# Patient Record
Sex: MALE | Race: OTHER | Employment: OTHER | URBAN - METROPOLITAN AREA
[De-identification: names, ages, dates, MRNs, and addresses within clinical notes are randomized per-mention and may not be internally consistent; named-entity substitution may affect disease eponyms.]

---

## 2020-05-07 ENCOUNTER — HOSPITAL ENCOUNTER (EMERGENCY)
Age: 45
Discharge: HOME | End: 2020-05-07
Attending: EMERGENCY MEDICINE
Payer: SELF-PAY

## 2020-05-07 VITALS
OXYGEN SATURATION: 98 % | DIASTOLIC BLOOD PRESSURE: 97 MMHG | RESPIRATION RATE: 16 BRPM | SYSTOLIC BLOOD PRESSURE: 160 MMHG | HEART RATE: 90 BPM | TEMPERATURE: 97.8 F

## 2020-05-07 DIAGNOSIS — S01.81XA FACIAL LACERATION, INITIAL ENCOUNTER: Primary | ICD-10-CM

## 2020-05-07 PROCEDURE — 74011250636 HC RX REV CODE- 250/636: Performed by: PHYSICIAN ASSISTANT

## 2020-05-07 PROCEDURE — 90715 TDAP VACCINE 7 YRS/> IM: CPT | Performed by: PHYSICIAN ASSISTANT

## 2020-05-07 PROCEDURE — 99282 EMERGENCY DEPT VISIT SF MDM: CPT

## 2020-05-07 PROCEDURE — 90471 IMMUNIZATION ADMIN: CPT

## 2020-05-07 PROCEDURE — 75810000293 HC SIMP/SUPERF WND  RPR

## 2020-05-07 RX ADMIN — TETANUS TOXOID, REDUCED DIPHTHERIA TOXOID AND ACELLULAR PERTUSSIS VACCINE, ADSORBED 0.5 ML: 5; 2.5; 8; 8; 2.5 SUSPENSION INTRAMUSCULAR at 10:07

## 2020-05-07 NOTE — ED PROVIDER NOTES
Patient is here with a laceration to his left eyebrow. This happened when he was getting some pallets off of a tall pile of pallets. 1 accidentally hit the side of his eye. The bleeding is controlled. He states that his last tetanus was at least 8 to 10 years ago. He has no headache, pain to the orbital area, visual changes, neck pain or other symptoms. He did ambulate to the room without difficulty and is well-hydrated. No other injuries. The history is provided by the patient. Laceration    The incident occurred less than 1 hour ago. The laceration is located on the face. The laceration is 1 cm in size. The injury mechanism is a blunt object. Foreign body present: no. The pain is at a severity of 5/10. The pain is moderate. The pain has been constant since onset. Pertinent negatives include no numbness, no tingling, no weakness, no loss of motion, no coolness and no discoloration. It is unknown when the patient last had a tetanus shot. No past medical history on file. No past surgical history on file. No family history on file.     Social History     Socioeconomic History    Marital status: Not on file     Spouse name: Not on file    Number of children: Not on file    Years of education: Not on file    Highest education level: Not on file   Occupational History    Not on file   Social Needs    Financial resource strain: Not on file    Food insecurity     Worry: Not on file     Inability: Not on file    Transportation needs     Medical: Not on file     Non-medical: Not on file   Tobacco Use    Smoking status: Not on file   Substance and Sexual Activity    Alcohol use: Not on file    Drug use: Not on file    Sexual activity: Not on file   Lifestyle    Physical activity     Days per week: Not on file     Minutes per session: Not on file    Stress: Not on file   Relationships    Social connections     Talks on phone: Not on file     Gets together: Not on file     Attends Buddhism service: Not on file     Active member of club or organization: Not on file     Attends meetings of clubs or organizations: Not on file     Relationship status: Not on file    Intimate partner violence     Fear of current or ex partner: Not on file     Emotionally abused: Not on file     Physically abused: Not on file     Forced sexual activity: Not on file   Other Topics Concern    Not on file   Social History Narrative    Not on file         ALLERGIES: Patient has no known allergies. Review of Systems   Constitutional: Negative. HENT: Negative. Eyes: Negative. Respiratory: Negative. Cardiovascular: Negative. Gastrointestinal: Negative. Genitourinary: Negative. Musculoskeletal: Negative. Skin: Positive for wound. Neurological: Negative. Negative for tingling, weakness and numbness. Psychiatric/Behavioral: Negative. All other systems reviewed and are negative. Vitals:    05/07/20 0948   BP: (!) 160/97   Pulse: 90   Resp: 16   Temp: 97.8 °F (36.6 °C)   SpO2: 98%            Physical Exam  Vitals signs and nursing note reviewed. Constitutional:       Appearance: He is well-developed. HENT:      Head: Normocephalic. Laceration present. No abrasion or left periorbital erythema. Jaw: There is normal jaw occlusion. Right Ear: External ear normal.      Left Ear: External ear normal.      Nose: Nose normal.   Eyes:      Conjunctiva/sclera: Conjunctivae normal.      Pupils: Pupils are equal, round, and reactive to light. Neck:      Musculoskeletal: Normal range of motion and neck supple. Cardiovascular:      Rate and Rhythm: Normal rate and regular rhythm. Heart sounds: Normal heart sounds. Pulmonary:      Effort: Pulmonary effort is normal.      Breath sounds: Normal breath sounds. Abdominal:      General: Bowel sounds are normal.      Palpations: Abdomen is soft. Musculoskeletal: Normal range of motion.    Skin:     General: Skin is warm and dry.   Neurological:      Mental Status: He is alert and oriented to person, place, and time. Deep Tendon Reflexes: Reflexes are normal and symmetric. Psychiatric:         Behavior: Behavior normal.         Thought Content: Thought content normal.         Judgment: Judgment normal.          MDM  Number of Diagnoses or Management Options  Facial laceration, initial encounter:   Risk of Complications, Morbidity, and/or Mortality  Presenting problems: moderate  Diagnostic procedures: moderate  Management options: moderate    Patient Progress  Patient progress: improved         Wound Repair  Date/Time: 5/7/2020 10:56 AM  Performed by: PAPreparation: skin prepped with Betadine  Pre-procedure re-eval: Immediately prior to the procedure, the patient was reevaluated and found suitable for the planned procedure and any planned medications. Time out: Immediately prior to the procedure a time out was called to verify the correct patient, procedure, equipment, staff and marking as appropriate. .  Location details: face  Wound length:2.5 cm or less  Anesthesia: local infiltration    Anesthesia:  Local Anesthetic: lidocaine 1% without epinephrine  Foreign bodies: no foreign bodies  Irrigation solution: saline  Irrigation method: syringe  Skin closure: Prolene  Number of sutures: 6  Technique: simple and interrupted  Approximation: close  Dressing: antibiotic ointment and 4x4  Patient tolerance: Patient tolerated the procedure well with no immediate complications  My total time at bedside, performing this procedure was 1-15 minutes. The patient was observed in the ED. Patient was instructed on wound care today. He was also asked to return if signs and symptoms of infection begin. Patient should return in 7 days for suture removal.  He has no injury to the orbital area. His tetanus was updated today. Wash two-three times daily with soap and water, blot dry, apply neosporin and a clean dressing.  Watch for redness, swelling, pus, increasing pain, fever and return if any of those symptoms begin. Finish all of the antibiotics. Return to the ED if worse. Patient is stable for discharge and ambulatory out of the ED without difficulty. I discussed the results of all labs, procedures, radiographs, and treatments with the patient and available family. Treatment plan is agreed upon and the patient is ready for discharge. All voiced understanding of the discharge plan and medication instructions or changes as appropriate. Questions about treatment in the ED were answered. All were encouraged to return should symptoms worsen or new problems develop.

## 2020-05-07 NOTE — ED TRIAGE NOTES
Patient states he was fixing a wooden pallet at work this morning and it hit him on his right eyebrow. Patient denies any loc. Patient states his last tetanus shot was over 8 years ago. Patient alert and oriented x4 in triage.  Patient has mask in place on arrival to triage

## 2020-05-07 NOTE — DISCHARGE INSTRUCTIONS
Patient Education      Use Zinco oxide on your wound this summer while out in the sun to minimize scar. Wash two-three times daily with soap and water, blot dry, apply neosporin and a clean dressing. Watch for redness, swelling, pus, increasing pain, fever and return if any of those symptoms begin. Finish all of the antibiotics. Return to the ED if worse. Patient is stable for discharge and ambulatory out of the ED without difficulty. Return in 7 days for suture removal.       Patient Education        Powers en la pat cerrados con puntos: Instrucciones de cuidado  Cuts on the Face Closed With Stitches: Care Instructions  Instrucciones de cuidado  Un devin en la pat puede ser en la barbilla, la mejilla, la nariz, la frente, el párpado, el labio o [employer]. El médico utilizó puntos para cerrar el devin. Usar puntos ayuda a que sane el devin y reduce la formación de cicatrices. El médico también puede lj recurrido a un especialista, antony un cirujano plástico, para cerrar el devin. Si el devin fue profundo y a través de la piel, el médico puede lj [employer] capas de puntos. En la capa más profunda, se juntan las partes profundas del devin. Esos puntos se disolverán, y no es necesario quitarlos. Los puntos de la capa superior son los que ve en el devin. Es probable que le pongan ian venda. Será necesario que julisa [facility] puntos, por lo general al cabo de 3 a 5 días. El médico lo muñoz revisado cuidadosamente, jennifer pueden aparecer [employer] tarde. Si observa algún problema o un síntoma nuevo, obtenga tratamiento médico de inmediato. La atención de seguimiento es ian parte clave de zuluaga tratamiento y seguridad. Asegúrese de hacer y acudir a todas las citas, y llame a zuluaga médico si está teniendo problemas. También es ian buena idea saber los resultados de misael exámenes y mantener ian lista de los medicamentos que em. ¿Cómo puede cuidarse en el hogar?   · [address] primeras 24 a 50 horas. Después de esto, puede ducharse si zuluaga médico lo aprueba. Seque el devin con toques suaves de toalla. · No sumerja el devin, antony, por ejemplo, en ian bañera. Zuluaga médico le dirá cuándo es seguro mojar el devin. · Si zuluaga médico le dijo cómo cuidarse el devin, siga las instrucciones de zuluaga médico. Si no le mitch instrucciones, siga estos consejos generales:  ? Después de las primeras 24 a 48 horas, lávese la maricarmen alrededor del devin con agua limpia 2 veces al día. No use peróxido de hidrógeno (agua [place]) ni alcohol, los cuales pueden retrasar la sanación. ? Puede cubrir el devin con ian capa delgada de vaselina y Nohemy Rodjuaquin venda no adherente. ? Aplíquese más vaselina y [facility] la venda según sea necesario. · Evite cualquier actividad que podría hacer que el devin se vuelva a abrir. · No se quite los puntos usted mismo. Zuluaga médico le dirá cuándo regresar para [employer]. · Sea caridad con los medicamentos. [place] los analgésicos (medicamentos para el dolor) exactamente según lo indicado. ? Si el médico le recetó un analgésico, tómelo según las indicaciones. ? Si no está tomando un analgésico recetado, pregúntele a zuluaga médico si puede eliana carson de [employer]. ¿Cuándo debe pedir ayuda? Llame a zuluaga médico ahora mismo o busque atención médica inmediata si:    · Siente un dolor nuevo o el dolor empeora.     · La piel cercana al devin está fría o pálida, o cambia de color.     · Siente hormigueo, debilitamiento o entumecimiento cerca del devin.     · El devin comienza a sangrar, y la vicki empapa la venda. Es normal que salgan pequeñas cantidades de Becky.     · Tiene síntomas de infección, tales antony:  ? Aumento del dolor, la hinchazón, el enrojecimiento o la temperatura alrededor del devin. ? Vetas rojizas que salen del devin. ? Pus que sale del devin. ? Percilla Deter especial atención a los cambios en zuluaga kennedy y asegúrese de comunicarse con zuluaga médico si:    · No mejora antony se esperaba. ¿Dónde puede encontrar más información en inglés? Regi Fuentes a [email or web address]-[email or web address].[email or web address]  Lakeisha Duran J081 en la búsqueda para aprender más acerca de \"Powers en la pat cerrados con puntos: Instrucciones de cuidado. \"  Revisado: 26 junio, 2019Versión del contenido: 12.4  © 6240-5981 Healthwise, [employer]. Las instrucciones de cuidado fueron adaptadas bajo licencia por [employer] (which disclaims liability or warranty for this information). Si usted tiene Hitchcock Admire afección médica o sobre estas instrucciones, siempre pregunte a zuluaga profesional de kennedy. [place], [employer] niega toda garantía o responsabilidad por zuluaga uso de esta información.

## 2020-05-07 NOTE — ED NOTES
I have reviewed discharge instructions with the patient. The patient verbalized understanding. Patient left ED via Discharge Method: ambulatory to Home with self  Opportunity for questions and clarification provided. Patient given 1 scripts. Given work note. No e-sign        To continue your aftercare when you leave the hospital, you may receive an automated call from our care team to check in on how you are doing. This is a free service and part of our promise to provide the best care and service to meet your aftercare needs.  If you have questions, or wish to unsubscribe from this service please call [phone]. Thank you for Choosing our [address] [place] [address] Emergency Department.

## 2020-05-07 NOTE — LETTER
[address] [facility] EMERGENCY DEPT 
[address] [facility] KIM Garcia 89 
[phone] Work/School Note Date: 5/7/2020 To Whom It May concern: 
 
Ángel Lee was seen and treated today in the emergency room by the following provider(s): 
Attending Provider: Pat Jefferson DO Physician Assistant: SCOT Logan. Ángel Lee may return to work on 05/11/20. Sincerely, SCOT Fontana

## 2020-05-07 NOTE — LETTER
[facility] EMERGENCY DEPT 
[address] [facility] 
[phone] Work/School Note Date: 5/7/2020 To Whom It May concern: 
 
Nima Barclay was seen and treated today in the emergency room by the following provider(s): 
Attending Provider: Nam Christensen DO Physician Assistant: SCOT Del Valle. Nima Barclay may return to work on 05/11/20. Sincerely, SCOT Etienne

## 2020-05-07 NOTE — LETTER
[address] [facility] EMERGENCY DEPT 
[address] [facility] 
[phone] Work/School Note Date: 5/7/2020 To Whom It May concern: 
 
Dulce Maria Holliday was seen and treated today in the emergency room by the following provider(s): 
Attending Provider: Heavenly Ibanez DO Physician Assistant: SCOT Blanco. Dulce Maria Holliday may return to work on 05/08/20. Sincerely, SCOT Berrios

## 2020-05-14 ENCOUNTER — HOSPITAL ENCOUNTER (EMERGENCY)
Age: 45
Discharge: HOME | End: 2020-05-14
Attending: EMERGENCY MEDICINE
Payer: SELF-PAY

## 2020-05-14 VITALS
DIASTOLIC BLOOD PRESSURE: 85 MMHG | TEMPERATURE: 98.6 F | RESPIRATION RATE: 16 BRPM | HEART RATE: 87 BPM | OXYGEN SATURATION: 96 % | SYSTOLIC BLOOD PRESSURE: 144 MMHG

## 2020-05-14 DIAGNOSIS — Z48.02 VISIT FOR SUTURE REMOVAL: Primary | ICD-10-CM

## 2020-05-14 PROCEDURE — 75810000275 HC EMERGENCY DEPT VISIT NO LEVEL OF CARE

## 2020-05-14 NOTE — DISCHARGE INSTRUCTIONS
Protect the wound from the sun  Keep Neosporin on the wound for 1 to 2 weeks  Return to ER for any worsening symptoms or new problems which may arise

## 2020-05-14 NOTE — ED TRIAGE NOTES
Pt here to have sutures removed from above left eyebrow. No s/sx of infection. Happened a week ago.  Dr. Shukri Hdz to triage to evaluate

## 2020-05-14 NOTE — ED PROVIDER NOTES
The history is provided by the patient. Suture Removal   This is a new problem. The current episode started more than 1 week ago. The problem occurs constantly. The problem has been gradually improving. Pertinent negatives include no chest pain, no abdominal pain, no headaches and no shortness of breath. Nothing aggravates the symptoms. Nothing relieves the symptoms. He has tried nothing for the symptoms. No past medical history on file. No past surgical history on file. No family history on file. Social History     Socioeconomic History    Marital status:      Spouse name: Not on file    Number of children: Not on file    Years of education: Not on file    Highest education level: Not on file   Occupational History    Not on file   Social Needs    Financial resource strain: Not on file    Food insecurity     Worry: Not on file     Inability: Not on file    Transportation needs     Medical: Not on file     Non-medical: Not on file   Tobacco Use    Smoking status: Not on file   Substance and Sexual Activity    Alcohol use: Not on file    Drug use: Not on file    Sexual activity: Not on file   Lifestyle    Physical activity     Days per week: Not on file     Minutes per session: Not on file    Stress: Not on file   Relationships    Social connections     Talks on phone: Not on file     Gets together: Not on file     Attends Rastafari service: Not on file     Active member of club or organization: Not on file     Attends meetings of clubs or organizations: Not on file     Relationship status: Not on file    Intimate partner violence     Fear of current or ex partner: Not on file     Emotionally abused: Not on file     Physically abused: Not on file     Forced sexual activity: Not on file   Other Topics Concern    Not on file   Social History Narrative    Not on file         ALLERGIES: Patient has no known allergies.     Review of Systems   Constitutional: Negative for activity change, chills, diaphoresis and fever. HENT: Negative for dental problem, hearing loss, nosebleeds, rhinorrhea and sore throat. Eyes: Negative for pain, discharge, redness and visual disturbance. Respiratory: Negative for cough, chest tightness and shortness of breath. Cardiovascular: Negative for chest pain, palpitations and leg swelling. Gastrointestinal: Negative for abdominal pain, constipation, diarrhea, nausea and vomiting. Endocrine: Negative for cold intolerance, heat intolerance, polydipsia and polyuria. Genitourinary: Negative for dysuria and flank pain. Musculoskeletal: Negative for arthralgias, back pain, joint swelling, myalgias and neck pain. Skin: Positive for wound. Negative for pallor and rash. Allergic/Immunologic: Negative for environmental allergies and food allergies. Neurological: Negative for dizziness, tremors, light-headedness, numbness and headaches. Hematological: Negative for adenopathy. Does not bruise/bleed easily. Psychiatric/Behavioral: Negative for confusion and dysphoric mood. The patient is not nervous/anxious and is not hyperactive. All other systems reviewed and are negative. Vitals:    05/14/20 1642   BP: 144/85   Pulse: 87   Resp: 16   Temp: 98.6 °F (37 °C)   SpO2: 96%            Physical Exam  Vitals signs and nursing note reviewed. Constitutional:       Appearance: Normal appearance. He is normal weight. HENT:      Head: Normocephalic and atraumatic. Mouth/Throat:      Mouth: Mucous membranes are moist.   Eyes:      Extraocular Movements: Extraocular movements intact. Conjunctiva/sclera: Conjunctivae normal.      Pupils: Pupils are equal, round, and reactive to light.       Comments: Left eyelid reveals a well-healed wound with multiple intake nylon sutures  There is no erythema swelling or fluctuance around the wound  No drainage or bleeding is noted   Cardiovascular:      Rate and Rhythm: Normal rate and regular rhythm. Pulses: Normal pulses. Heart sounds: Normal heart sounds. Pulmonary:      Effort: Pulmonary effort is normal. No respiratory distress. Skin:     General: Skin is warm and dry. Capillary Refill: Capillary refill takes less than 2 seconds. Neurological:      General: No focal deficit present. Mental Status: He is alert and oriented to person, place, and time. Psychiatric:         Mood and Affect: Mood normal.         Behavior: Behavior normal.          MDM  Number of Diagnoses or Management Options  Visit for suture removal: new and does not require workup  Diagnosis management comments: Well-healed wound on the left upper eyelid  Will remove sutures  Continue Neosporin for 2 weeks       Amount and/or Complexity of Data Reviewed  Review and summarize past medical records: yes    Risk of Complications, Morbidity, and/or Mortality  Presenting problems: low  Diagnostic procedures: minimal  Management options: low  General comments: Elements of this note have been dictated via voice recognition software. Text and phrases may be limited by the accuracy of the software. The chart has been reviewed, but errors may still be present.       Patient Progress  Patient progress: improved         Procedures

## 2021-02-09 ENCOUNTER — HOSPITAL ENCOUNTER (EMERGENCY)
Age: 46
Discharge: HOME | End: 2021-02-09
Attending: EMERGENCY MEDICINE

## 2021-02-09 VITALS
WEIGHT: 150 LBS | HEIGHT: 64 IN | BODY MASS INDEX: 25.61 KG/M2 | TEMPERATURE: 98.1 F | HEART RATE: 66 BPM | SYSTOLIC BLOOD PRESSURE: 149 MMHG | OXYGEN SATURATION: 97 % | DIASTOLIC BLOOD PRESSURE: 91 MMHG | RESPIRATION RATE: 16 BRPM

## 2021-02-09 DIAGNOSIS — S05.02XA ABRASION OF LEFT CORNEA, INITIAL ENCOUNTER: Primary | ICD-10-CM

## 2021-02-09 PROCEDURE — 99284 EMERGENCY DEPT VISIT MOD MDM: CPT

## 2021-02-09 PROCEDURE — 74011000250 HC RX REV CODE- 250: Performed by: PHYSICIAN ASSISTANT

## 2021-02-09 PROCEDURE — 74011250637 HC RX REV CODE- 250/637: Performed by: PHYSICIAN ASSISTANT

## 2021-02-09 RX ORDER — IBUPROFEN 600 MG/1
600 TABLET ORAL
Qty: 20 TAB | Refills: 0 | Status: SHIPPED | OUTPATIENT
Start: 2021-02-09

## 2021-02-09 RX ORDER — ERYTHROMYCIN 5 MG/G
OINTMENT OPHTHALMIC
Qty: 3.5 G | Refills: 0 | Status: SHIPPED | OUTPATIENT
Start: 2021-02-09 | End: 2021-02-16

## 2021-02-09 RX ORDER — TETRACAINE HYDROCHLORIDE 5 MG/ML
1 SOLUTION OPHTHALMIC
Status: COMPLETED | OUTPATIENT
Start: 2021-02-09 | End: 2021-02-09

## 2021-02-09 RX ORDER — ERYTHROMYCIN 5 MG/G
OINTMENT OPHTHALMIC
Status: COMPLETED | OUTPATIENT
Start: 2021-02-09 | End: 2021-02-09

## 2021-02-09 RX ADMIN — FLUORESCEIN SODIUM 1 STRIP: 1 STRIP OPHTHALMIC at 11:26

## 2021-02-09 RX ADMIN — TETRACAINE HYDROCHLORIDE 1 DROP: 5 SOLUTION OPHTHALMIC at 11:26

## 2021-02-09 RX ADMIN — ERYTHROMYCIN: 5 OINTMENT OPHTHALMIC at 11:26

## 2021-02-09 NOTE — LETTER
[address] [facility] EMERGENCY DEPT 
[address] [facility] KIM Garcia [address] 
[phone] Work/School Note Date: 2/9/2021 To Whom It May concern: 
 
Eddi Guillaume was seen and treated today in the emergency room by the following provider(s): 
Attending Provider: Jose Guadalupe Chang MD 
Physician Assistant: SCOT Santiago. Eddi Guillaume may return to work after appointment with Kofi Adams. Sincerely, SCOT Saxena

## 2021-02-09 NOTE — DISCHARGE INSTRUCTIONS
Wear safety glasses that seal around the eye while at work. Use the erythromycin ointment as directed. Follow up tomorrow with Dr. Vipul Alba in the office on [facility], [address] [place] [address],[address] [employer]. Call today for an appointment for tomorrow.

## 2021-02-09 NOTE — ED TRIAGE NOTES
Pt arrives ambulatory to triage with mask in place. Pt works a [employer] and had something land in his left eye while using a saw yesterday. Reports excessive tears from eye. Reports vision from eye is blurry and pain is worse when eye is open.

## 2021-02-09 NOTE — ED NOTES
I have reviewed discharge instructions with the patient. The patient verbalized understanding. Patient left ED via Discharge Method: ambulatory to Home with family. Opportunity for questions and clarification provided. Patient given 2 scripts. To continue your aftercare when you leave the hospital, you may receive an automated call from our care team to check in on how you are doing. This is a free service and part of our promise to provide the best care and service to meet your aftercare needs.  If you have questions, or wish to unsubscribe from this service please call [phone]. Thank you for Choosing our [facility] Emergency Department.

## 2021-02-09 NOTE — ED PROVIDER NOTES
Patient was at work yesterday at [employer] [facility] [employer] and was sawing a piece of wood when he felt something fly into his left eye. He has had pain and tearing in the eye since then. He has had some blurry vision. He does speak [place] and [place] and has an  here with him. His last tetanus shot was May 7 of 2020. Patient was wearing safety glasses and does wear glasses for distance and close up. He does not have his glasses with him today for his visual acuity. The history is provided by the patient. Eye Injury   This is a new problem. The current episode started yesterday. The problem occurs constantly. The problem has not changed since onset. The left eye is affected. The injury mechanism was a foreign body. The pain is at a severity of 9/10. The pain is moderate. There is history of trauma to the eye. There is no known exposure to pink eye. Associated symptoms include blurred vision, decreased vision, discharge, foreign body sensation, photophobia, eye redness, negative and pain. Pertinent negatives include no numbness, no double vision, no nausea, no vomiting, no tingling, no weakness, no itching, no fever, no blindness, no head injury and no dizziness. He has tried nothing for the symptoms. No past medical history on file. No past surgical history on file. No family history on file.     Social History     Socioeconomic History    Marital status:      Spouse name: Not on file    Number of children: Not on file    Years of education: Not on file    Highest education level: Not on file   Occupational History    Not on file   Social Needs    Financial resource strain: Not on file    Food insecurity     Worry: Not on file     Inability: Not on file    Transportation needs     Medical: Not on file     Non-medical: Not on file   Tobacco Use    Smoking status: Not on file   Substance and Sexual Activity    Alcohol use: Not on file    Drug use: Not on file    Sexual activity: Not on file   Lifestyle    Physical activity     Days per week: Not on file     Minutes per session: Not on file    Stress: Not on file   Relationships    Social connections     Talks on phone: Not on file     Gets together: Not on file     Attends Nondenominational service: Not on file     Active member of club or organization: Not on file     Attends meetings of clubs or organizations: Not on file     Relationship status: Not on file    Intimate partner violence     Fear of current or ex partner: Not on file     Emotionally abused: Not on file     Physically abused: Not on file     Forced sexual activity: Not on file   Other Topics Concern    Not on file   Social History Narrative    Not on file         ALLERGIES: Patient has no known allergies. Review of Systems   Constitutional: Negative. Negative for fever. HENT: Negative. Eyes: Positive for blurred vision, photophobia, pain, discharge, redness and visual disturbance. Negative for blindness, double vision and itching. Respiratory: Negative. Cardiovascular: Negative. Gastrointestinal: Negative. Negative for nausea and vomiting. Genitourinary: Negative. Musculoskeletal: Negative. Skin: Negative. Negative for itching. Neurological: Negative. Negative for dizziness, tingling, weakness and numbness. Psychiatric/Behavioral: Negative. All other systems reviewed and are negative. Vitals:    02/09/21 0835   BP: 135/87   Pulse: 80   Resp: 16   Temp: 98 °F (36.7 °C)   SpO2: 99%   Weight: 68 kg (150 lb)   Height: 5' 4\" (1.626 m)            Physical Exam  Vitals signs and nursing note reviewed. Constitutional:       Appearance: He is well-developed. HENT:      Head: Normocephalic and atraumatic. Right Ear: External ear normal.      Left Ear: External ear normal.      Nose: Nose normal.   Eyes:      General: Lids are normal. Vision grossly intact. Gaze aligned appropriately. Left eye: Discharge present. Extraocular Movements: Extraocular movements intact. Conjunctiva/sclera:      Left eye: Left conjunctiva is injected. No hemorrhage. Pupils: Pupils are equal, round, and reactive to light. Neck:      Musculoskeletal: Normal range of motion and neck supple. Cardiovascular:      Rate and Rhythm: Normal rate and regular rhythm. Heart sounds: Normal heart sounds. Pulmonary:      Effort: Pulmonary effort is normal.      Breath sounds: Normal breath sounds. Abdominal:      General: Bowel sounds are normal.      Palpations: Abdomen is soft. Musculoskeletal: Normal range of motion. Skin:     General: Skin is warm and dry. Neurological:      Mental Status: He is alert and oriented to person, place, and time. Deep Tendon Reflexes: Reflexes are normal and symmetric. Psychiatric:         Behavior: Behavior normal.         Thought Content: Thought content normal.         Judgment: Judgment normal.          MDM  Number of Diagnoses or Management Options     Amount and/or Complexity of Data Reviewed  Discuss the patient with other providers: yes (Dr. Belen Ramires ophthalmology)    Risk of Complications, Morbidity, and/or Mortality  Presenting problems: moderate  Diagnostic procedures: moderate  Management options: moderate           Procedures  Visual acuity Left 20/200, Right 20/25, Bilateral 20/25  9:37 AM ophthalmology paged. 10:10 AM spoke with Dr. Belen Ramires regarding patient. We discussed the patient's history, physical exam, work up and disposition. He will see the patient in follow up tomorrow in the office. Patient is stable for discharge and ambulatory out of the ED without difficulty. The patient was observed in the ED. I discussed the results of all labs, procedures, radiographs, and treatments with the patient and available family. Treatment plan is agreed upon and the patient is ready for discharge.   All voiced understanding of the discharge plan and medication instructions or changes as appropriate. Questions about treatment in the ED were answered. All were encouraged to return should symptoms worsen or new problems develop.